# Patient Record
Sex: FEMALE | Race: WHITE | Employment: UNEMPLOYED | URBAN - METROPOLITAN AREA
[De-identification: names, ages, dates, MRNs, and addresses within clinical notes are randomized per-mention and may not be internally consistent; named-entity substitution may affect disease eponyms.]

---

## 2017-01-12 ENCOUNTER — OFFICE VISIT (OUTPATIENT)
Dept: CARDIOLOGY CLINIC | Age: 67
End: 2017-01-12

## 2017-01-12 VITALS
BODY MASS INDEX: 23.3 KG/M2 | SYSTOLIC BLOOD PRESSURE: 120 MMHG | DIASTOLIC BLOOD PRESSURE: 72 MMHG | HEIGHT: 66 IN | WEIGHT: 145 LBS | HEART RATE: 62 BPM

## 2017-01-12 DIAGNOSIS — R94.31 ABNORMAL EKG: ICD-10-CM

## 2017-01-12 DIAGNOSIS — E78.5 HYPERLIPIDEMIA, UNSPECIFIED HYPERLIPIDEMIA TYPE: ICD-10-CM

## 2017-01-12 DIAGNOSIS — I25.10 CORONARY ARTERY DISEASE INVOLVING NATIVE CORONARY ARTERY OF NATIVE HEART WITHOUT ANGINA PECTORIS: ICD-10-CM

## 2017-01-12 DIAGNOSIS — Z72.0 TOBACCO ABUSE: ICD-10-CM

## 2017-01-12 DIAGNOSIS — R06.02 SHORTNESS OF BREATH: Primary | ICD-10-CM

## 2017-01-12 PROCEDURE — 93000 ELECTROCARDIOGRAM COMPLETE: CPT | Performed by: INTERNAL MEDICINE

## 2017-01-12 PROCEDURE — 99214 OFFICE O/P EST MOD 30 MIN: CPT | Performed by: INTERNAL MEDICINE

## 2017-01-12 RX ORDER — ATORVASTATIN CALCIUM 40 MG/1
40 TABLET, FILM COATED ORAL DAILY
Qty: 30 TABLET | Refills: 6
Start: 2017-01-12 | End: 2018-10-24

## 2017-01-12 RX ORDER — ASPIRIN 81 MG/1
81 TABLET ORAL DAILY
Qty: 30 TABLET | Refills: 3
Start: 2017-01-12 | End: 2018-10-24

## 2017-01-12 RX ORDER — ATORVASTATIN CALCIUM 80 MG/1
80 TABLET, FILM COATED ORAL DAILY
Qty: 30 TABLET | Refills: 3
Start: 2017-01-12 | End: 2017-01-12 | Stop reason: SDUPTHER

## 2017-01-12 RX ORDER — METOPROLOL SUCCINATE 25 MG/1
25 TABLET, EXTENDED RELEASE ORAL DAILY
Qty: 30 TABLET | Refills: 6 | Status: SHIPPED | OUTPATIENT
Start: 2017-01-12 | End: 2018-10-24

## 2017-05-05 ENCOUNTER — TELEPHONE (OUTPATIENT)
Dept: CARDIOLOGY CLINIC | Age: 67
End: 2017-05-05

## 2018-10-24 ENCOUNTER — OFFICE VISIT (OUTPATIENT)
Dept: CARDIOLOGY CLINIC | Age: 68
End: 2018-10-24
Payer: MEDICARE

## 2018-10-24 VITALS
SYSTOLIC BLOOD PRESSURE: 120 MMHG | BODY MASS INDEX: 27.16 KG/M2 | DIASTOLIC BLOOD PRESSURE: 80 MMHG | HEART RATE: 67 BPM | WEIGHT: 169 LBS | HEIGHT: 66 IN

## 2018-10-24 DIAGNOSIS — R06.02 SHORTNESS OF BREATH: ICD-10-CM

## 2018-10-24 DIAGNOSIS — Z01.818 PRE-OPERATIVE CLEARANCE: Primary | ICD-10-CM

## 2018-10-24 DIAGNOSIS — Z72.0 TOBACCO ABUSE: ICD-10-CM

## 2018-10-24 PROCEDURE — 93000 ELECTROCARDIOGRAM COMPLETE: CPT | Performed by: INTERNAL MEDICINE

## 2018-10-24 PROCEDURE — 99214 OFFICE O/P EST MOD 30 MIN: CPT | Performed by: INTERNAL MEDICINE

## 2018-10-24 RX ORDER — METOPROLOL SUCCINATE 25 MG/1
25 TABLET, EXTENDED RELEASE ORAL DAILY
Qty: 30 TABLET | Refills: 6 | Status: SHIPPED | OUTPATIENT
Start: 2018-10-24 | End: 2019-10-24

## 2018-10-24 RX ORDER — ATORVASTATIN CALCIUM 40 MG/1
40 TABLET, FILM COATED ORAL DAILY
Qty: 30 TABLET | Refills: 6 | Status: SHIPPED | OUTPATIENT
Start: 2018-10-24 | End: 2019-10-24

## 2018-10-24 NOTE — PROGRESS NOTES
exacerbation. EKG today shows normal sinus rhythm with a leftward axis and no ischemia. Cath ([facility] radial artery) 12/21/16: left main free of ds, D1 long segment 99% stenosis w/ RUDY flow, LCx free of ds, RCA 50% proximal to mid, EF 60%  GXT 12/9/16: normal perfusion  Echo 12/9/16: essentially normal findings  Cath info found 6/24/02 at [facility] 1vessel branch disease    2. Hyperlipidemia -go back on Lipitor    3. Shortness of breath -due to COPD with bronchiectasis. 4. Tobacco abuse -quit years ago, but smokes occasionally       PLAN:  Advised to restart 81mg aspirin, Lipitor 40mg and Toprol XL 25mg daily for her coronary disease and these meds are not dependent on how she feels. . FU 6 months. She is stable for abdominal surgery. Thank you for allowing to me to participate in the care of Lucila. Scribe's attestation: This note was scribed in the presence of Dr Katerina Delgadillo MD by Mario Alberto Choi RN. The scribe's documentation has been prepared under my direction and personally reviewed by me in its entirety. I confirm that the note above accurately reflects all work, treatment, procedures, and medical decision making performed by me.

## 2018-11-12 ENCOUNTER — TELEPHONE (OUTPATIENT)
Dept: CARDIOLOGY CLINIC | Age: 68
End: 2018-11-12

## 2018-11-23 PROBLEM — Z01.818 PRE-OPERATIVE CLEARANCE: Status: RESOLVED | Noted: 2018-10-24 | Resolved: 2018-11-23

## 2018-12-13 ENCOUNTER — TELEPHONE (OUTPATIENT)
Dept: CARDIOLOGY CLINIC | Age: 68
End: 2018-12-13

## 2019-01-16 ENCOUNTER — TELEPHONE (OUTPATIENT)
Dept: CARDIOLOGY CLINIC | Age: 69
End: 2019-01-16

## 2019-01-16 DIAGNOSIS — R06.02 SHORTNESS OF BREATH: ICD-10-CM

## 2019-01-16 DIAGNOSIS — Z01.818 PRE-OPERATIVE CLEARANCE: Primary | ICD-10-CM

## 2019-02-15 PROBLEM — Z01.818 PRE-OPERATIVE CLEARANCE: Status: RESOLVED | Noted: 2018-10-24 | Resolved: 2019-02-15

## 2019-10-24 ENCOUNTER — OFFICE VISIT (OUTPATIENT)
Dept: CARDIOLOGY CLINIC | Age: 69
End: 2019-10-24
Payer: MEDICARE

## 2019-10-24 VITALS
SYSTOLIC BLOOD PRESSURE: 114 MMHG | BODY MASS INDEX: 24.11 KG/M2 | DIASTOLIC BLOOD PRESSURE: 64 MMHG | WEIGHT: 150 LBS | HEART RATE: 65 BPM | HEIGHT: 66 IN

## 2019-10-24 DIAGNOSIS — I25.10 CORONARY ARTERY DISEASE INVOLVING NATIVE CORONARY ARTERY OF NATIVE HEART WITHOUT ANGINA PECTORIS: Primary | ICD-10-CM

## 2019-10-24 DIAGNOSIS — R06.02 SHORTNESS OF BREATH: ICD-10-CM

## 2019-10-24 DIAGNOSIS — E78.5 HYPERLIPIDEMIA, UNSPECIFIED HYPERLIPIDEMIA TYPE: ICD-10-CM

## 2019-10-24 PROCEDURE — 93000 ELECTROCARDIOGRAM COMPLETE: CPT | Performed by: INTERNAL MEDICINE

## 2019-10-24 PROCEDURE — 99214 OFFICE O/P EST MOD 30 MIN: CPT | Performed by: INTERNAL MEDICINE

## 2019-10-24 RX ORDER — VARENICLINE TARTRATE 0.5 MG/1
0.5 TABLET, FILM COATED ORAL
COMMUNITY
Start: 2019-05-22 | End: 2021-12-14

## 2019-10-24 RX ORDER — ZOLPIDEM TARTRATE 10 MG/1
TABLET ORAL NIGHTLY PRN
COMMUNITY

## 2020-09-29 ENCOUNTER — TELEPHONE (OUTPATIENT)
Dept: CARDIOLOGY CLINIC | Age: 70
End: 2020-09-29

## 2020-09-29 NOTE — TELEPHONE ENCOUNTER
Pt calling to schedule her FU appt at [facility].  Please call pt to schedule

## 2020-10-21 ENCOUNTER — OFFICE VISIT (OUTPATIENT)
Dept: CARDIOLOGY CLINIC | Age: 70
End: 2020-10-21
Payer: MEDICARE

## 2020-10-21 VITALS
DIASTOLIC BLOOD PRESSURE: 64 MMHG | OXYGEN SATURATION: 97 % | SYSTOLIC BLOOD PRESSURE: 104 MMHG | HEART RATE: 70 BPM | BODY MASS INDEX: 23.78 KG/M2 | WEIGHT: 148 LBS | HEIGHT: 66 IN

## 2020-10-21 PROCEDURE — 99214 OFFICE O/P EST MOD 30 MIN: CPT | Performed by: NURSE PRACTITIONER

## 2020-10-21 NOTE — PROGRESS NOTES
[address]   Cardiac Evaluation      Patient: Serina Alexandre  YOB: 1950  Date: 10/21/20     CC: CAD      Referring provider: FATUMA Multani CNP    History of Present Illness:   Ms Feliz Salazar is seen today for cardiac clearance for gastric sleeve. She has had a lab band and has been told that her esophagus is dilated which required some or all of the saline removed from the band. She has requested to have it filled again due to increasing weight and has been told that this is not medically appropriate. She presented to [facility] in 2016 with COPD exacerbation. trops consistently increased; 0.362, 0.967, 1.670. She underwent a cath through the radial artery. She has disease in D1 and RCA. She was placed on Metoprolol, Lipitor, Plavix, and asa. She states I performed an angiogram years ago (prior to 2007)and that she had CAD with  collaterals. This was performed after she had an MI. She has extensive pulmonary disease w/ COPD but has quit smoking. . Surgeries include lap band surgery, tonsillectomy, carpal tunnel release, tubal ligation. She states she smokes on occasion, but not regularly. In the interim since last 10/2018, she had gastric band removed and pouch formed. At that last visit, she told me she stopped taking asa, BB, & statin because she \"felt fine\"; I advised her to restart all of them for her overall CV health. Today, Ms. Shirley is here for her yearly follow up. She states she has no complaints. She has been active walking around the yard with her three dogs. She denies chest pain, shortness of breath, palpitations, dizziness, or edema. She enjoys embroidering, has a large sewing/embroidery machine in her sewing room, belongs to a club. Past Medical History:   has a past medical history of MI (myocardial infarction) ([address] COPD, morbid obesity. Surgical History:   has a past surgical history that includes Tonsillectomy; Lap Band; Carpal tunnel release;  Foot surgery; Tubal ligation; partial hysterectomy (cervix not removed); and bladder repair (2-14). Current Outpatient Medications   Medication Sig Dispense Refill    umeclidinium-vilanterol (ANORO ELLIPTA) 62.5-25 MCG/INH AEPB inhaler Inhale 62.5 mcg into the lungs daily      fluticasone (VERAMYST) 27.5 MCG/SPRAY nasal spray 2 sprays by Each Nostril route daily      zolpidem (AMBIEN) 10 MG tablet Take by mouth nightly as needed for Sleep.  varenicline (CHANTIX) 0.5 MG tablet Take 0.5 mg by mouth      PROVENTIL  (90 BASE) MCG/ACT inhaler Inhale 2 puffs into the lungs every 6 hours as needed.  citalopram (CELEXA) 20 MG tablet Take 1 tablet by mouth daily. No current facility-administered medications for this visit.       Social History:  Social History     Socioeconomic History    Marital status:      Spouse name: Not on file    Number of children: Not on file    Years of education: Not on file    Highest education level: Not on file   Occupational History    Not on file   Social Needs    Financial resource strain: Not on file    Food insecurity     Worry: Not on file     Inability: Not on file    Transportation needs     Medical: Not on file     Non-medical: Not on file   Tobacco Use    Smoking status: Former Smoker     Packs/day: 1.00     Years: 40.00     Pack years: 40.00     Last attempt to quit: 2013     Years since quittin.3    Smokeless tobacco: Never Used   Substance and Sexual Activity    Alcohol use: No    Drug use: Not on file    Sexual activity: Not on file   Lifestyle    Physical activity     Days per week: Not on file     Minutes per session: Not on file    Stress: Not on file   Relationships    Social connections     Talks on phone: Not on file     Gets together: Not on file     Attends Christian service: Not on file     Active member of club or organization: Not on file     Attends meetings of clubs or organizations: Not on file Relationship status: Not on file    Intimate partner violence     Fear of current or ex partner: Not on file     Emotionally abused: Not on file     Physically abused: Not on file     Forced sexual activity: Not on file   Other Topics Concern    Not on file   Social History Narrative    Not on file     Family History:  family history includes Diabetes in her father and mother; Heart Disease in her father. Allergies:  Percocet [oxycodone-acetaminophen]     Review of Systems:   · Constitutional: there has been no unanticipated weight loss. No change in energy or activity level   · Eyes: No visual changes   · ENT: No Headaches, hearing loss or vertigo. No mouth sores or sore throat. · Cardiovascular: Reviewed in HPI  · Respiratory: No cough or wheezing, no sputum production. · Gastrointestinal: No abdominal pain, appetite loss, blood in stools. No change in bowel or bladder habits. · Genitourinary: No nocturia, dysuria, trouble voiding  · Musculoskeletal:  No gait disturbance, weakness or joint complaints. · Integumentary: No rash or pruritis. · Neurological: No headache, change in muscle strength, numbness or tingling. No change in gait, balance, coordination, mood, affect, memory, mentation, behavior. · Psychiatric: No anxiety or depression  · Endocrine: No malaise or fever  · Hematologic/Lymphatic: No abnormal bruising or bleeding, blood clots or swollen lymph nodes. · Allergic/Immunologic: No nasal congestion or hives. Physical Examination:    Vitals:    10/21/20 1346   BP: 104/64   Site: Left Upper Arm   Position: Sitting   Cuff Size: Medium Adult   Pulse: 70   SpO2: 97%   Weight: 148 lb (67.1 kg)   Height: 5' 6\" (1.676 m)     Body mass index is 23.89 kg/m².      Wt Readings from Last 3 Encounters:   10/21/20 148 lb (67.1 kg)   10/24/19 150 lb (68 kg)   10/24/18 169 lb (76.7 kg)      BP Readings from Last 3 Encounters:   10/21/20 104/64   10/24/19 114/64   10/24/18 120/80      Constitutional and General Appearance:  appears stated age,  thin (especially compared to 14 years ago)  Eyes - no xanthelasma  Respiratory:  · Normal excursion and expansion without use of accessory muscles  · Resp Auscultation: Normal breath sounds without dullness  Cardiovascular:  · The apical impulses not displaced  · Heart is regular rate and rhythm with normal S1, S2  · PMI is normal  · The carotid upstroke is normal, no bruit noted   · JVP is not elevated  · Peripheral pulses are symmetrical  · There is no clubbing, cyanosis of the extremities  · No edema  · No varicosities  · Femoral Arteries: 2+ and equal without bruits  · Pedal Pulses: 2+ and equal   Abdomen:  · No masses or tenderness  · Aorta not palpable  · Normal bowel sounds  Neurological/Psychiatric:  · Alert and oriented x3  · Moves all extremities well  · Exhibits normal gait balance and coordination    Assessment:  1. CAD: Stable, no anginal symptoms. She will not take aspirin due to bruising. NSTEMI while at [facility] w/ COPD exacerbation. EKG today 10/24/19 (read & interpreted by me)> NSR 65  EKG 10/24/18> normal sinus rhythm with a leftward axis and no ischemia. Cath ([facility] radial artery) 12/21/16> left main free of ds, D1 long segment 99% stenosis w/ RUDY flow, LCx free of ds, RCA 50% proximal to mid, EF 60%  GXT raiza 12/9/16> normal perfusion  Echo 12/9/16> essentially normal findings  Cath info found 6/24/02 at [facility] 1 vessel branch disease    2. Hyperlipidemia: Restarted Lipitor 40mg at last visit 10/2018, but she only took it for a short time. 10/9/19> , TG 58, HDL 75, LDL 88 -- drawn off statin. 3. Shortness of breath, chronic: Stable, able to do stairs without issues. Due to COPD with bronchiectasis. 4. Tobacco abuse: quit years ago. She takes Chantix when she is around others who are smoking. Plan: Check yearly lipids/BMP. No medication changes at this time. Return in one year with Dr. Torres Read, or sooner if needed.      Thank you for allowing

## 2021-10-25 ENCOUNTER — TELEPHONE (OUTPATIENT)
Dept: CARDIOLOGY CLINIC | Age: 71
End: 2021-10-25

## 2021-10-25 NOTE — TELEPHONE ENCOUNTER
She has an appt this week with MANUELITO in the [address] [place] [address],[address] office . She needs to rs due to a death in the family .

## 2021-12-14 ENCOUNTER — OFFICE VISIT (OUTPATIENT)
Dept: CARDIOLOGY CLINIC | Age: 71
End: 2021-12-14
Payer: MEDICARE

## 2021-12-14 VITALS
HEIGHT: 66 IN | SYSTOLIC BLOOD PRESSURE: 124 MMHG | WEIGHT: 140 LBS | HEART RATE: 66 BPM | OXYGEN SATURATION: 99 % | DIASTOLIC BLOOD PRESSURE: 78 MMHG | BODY MASS INDEX: 22.5 KG/M2

## 2021-12-14 DIAGNOSIS — R06.02 SHORTNESS OF BREATH: ICD-10-CM

## 2021-12-14 DIAGNOSIS — R94.31 ABNORMAL EKG: ICD-10-CM

## 2021-12-14 DIAGNOSIS — I25.10 CORONARY ARTERY DISEASE INVOLVING NATIVE CORONARY ARTERY OF NATIVE HEART WITHOUT ANGINA PECTORIS: Primary | ICD-10-CM

## 2021-12-14 DIAGNOSIS — Z72.0 TOBACCO ABUSE: ICD-10-CM

## 2021-12-14 PROCEDURE — 99214 OFFICE O/P EST MOD 30 MIN: CPT | Performed by: INTERNAL MEDICINE

## 2021-12-14 NOTE — PROGRESS NOTES
[facility]   Cardiac Evaluation      Patient: Cynthia Malcolm  YOB: 1950  Date: 12/14/21     CC: CAD      Referring provider: FATUMA Bautista CNP    History of Present Illness:   Ms Samy Meyer is seen today for follow up. She has had a lab band and has been told that her esophagus is dilated which required some or all of the saline removed from the band. She has requested to have it filled again due to increasing weight and has been told that this is not medically appropriate. She presented to [facility] in 2016 with COPD exacerbation. trops consistently increased; 0.362, 0.967, 1.670. She underwent a cath through the radial artery. She has disease in D1 and RCA. She was placed on Metoprolol, Lipitor, Plavix, and asa. She states I performed an angiogram years ago (prior to 2007)and that she had CAD with  collaterals. This was performed after she had an MI. She has extensive pulmonary disease w/ COPD but has quit smoking. . Surgeries include lap band surgery, tonsillectomy, carpal tunnel release, tubal ligation. She states she smokes on occasion, but not regularly. In the interim since last 10/2018, she had gastric band removed and pouch formed. At that last visit, she told me she stopped taking asa, BB, & statin because she \"felt fine\"; I advised her to restart all of them for her overall CV health. Today, Ms. Shirley is here for her yearly follow up. She offers no complaints. Barbie denies chest pain, palpitations, SHULTZ, dizziness, or edema. She does leg lifts and walks stairs at home for exercise. Past Medical History:   has a past medical history of MI (myocardial infarction) ([address] COPD, morbid obesity. Surgical History:   has a past surgical history that includes Tonsillectomy; Lap Band; Carpal tunnel release; Foot surgery; Tubal ligation; partial hysterectomy (cervix not removed); and bladder repair (2-1-14).      Current Outpatient Medications   Medication Sig Dispense Refill    tiotropium (SPIRIVA RESPIMAT) 2.5 MCG/ACT AERS inhaler INHALE ONE PUFF BY MOUTH DAILY      mometasone-formoterol (DULERA) 200-5 MCG/ACT inhaler Inhale 2 puffs into the lungs 2 times daily      zolpidem (AMBIEN) 10 MG tablet Take by mouth nightly as needed for Sleep.  citalopram (CELEXA) 20 MG tablet Take 1 tablet by mouth daily.  fluticasone (VERAMYST) 27.5 MCG/SPRAY nasal spray 2 sprays by Each Nostril route daily      PROVENTIL  (90 BASE) MCG/ACT inhaler Inhale 2 puffs into the lungs every 6 hours as needed. No current facility-administered medications for this visit. Social History:  Social History     Socioeconomic History    Marital status:      Spouse name: Not on file    Number of children: Not on file    Years of education: Not on file    Highest education level: Not on file   Occupational History    Not on file   Tobacco Use    Smoking status: Former Smoker     Packs/day: 1.00     Years: 40.00     Pack years: 40.00     Quit date: 2013     Years since quittin.4    Smokeless tobacco: Never Used   Substance and Sexual Activity    Alcohol use: No    Drug use: Not on file    Sexual activity: Not on file   Other Topics Concern    Not on file   Social History Narrative    Not on file     Social Determinants of Health     Financial Resource Strain:     Difficulty of Paying Living Expenses: Not on file   Food Insecurity:     Worried About [address] [employer] in the Last Year: Not on file    Clarisa of Food in the Last Year: Not on file   Transportation Needs:     Lack of Transportation (Medical): Not on file    Lack of Transportation (Non-Medical):  Not on file   Physical Activity:     Days of Exercise per Week: Not on file    Minutes of Exercise per Session: Not on file   Stress:     Feeling of Stress : Not on file   Social Connections:     Frequency of Communication with Friends and Family: Not on file    Frequency of Social Gatherings with Friends and Family: Not on file    Attends Rastafari Services: Not on file    Active Member of Clubs or Organizations: Not on file    Attends Club or Organization Meetings: Not on file    Marital Status: Not on file   Intimate Partner Violence:     Fear of Current or Ex-Partner: Not on file    Emotionally Abused: Not on file    Physically Abused: Not on file    Sexually Abused: Not on file   Housing Stability:     Unable to Pay for Housing in the Last Year: Not on file    Number of [place] in the Last Year: Not on file    Unstable Housing in the Last Year: Not on file     Family History:  family history includes Diabetes in her father and mother; Heart Disease in her father. Allergies:  Percocet [oxycodone-acetaminophen]     Review of Systems:   · Constitutional: there has been no unanticipated weight loss. No change in energy or activity level   · Eyes: No visual changes   · ENT: No Headaches, hearing loss or vertigo. No mouth sores or sore throat. · Cardiovascular: Reviewed in HPI  · Respiratory: No cough or wheezing, no sputum production. · Gastrointestinal: No abdominal pain, appetite loss, blood in stools. No change in bowel or bladder habits. · Genitourinary: No nocturia, dysuria, trouble voiding  · Musculoskeletal:  No gait disturbance, weakness or joint complaints. · Integumentary: No rash or pruritis. · Neurological: No headache, change in muscle strength, numbness or tingling. No change in gait, balance, coordination, mood, affect, memory, mentation, behavior. · Psychiatric: No anxiety or depression  · Endocrine: No malaise or fever  · Hematologic/Lymphatic: No abnormal bruising or bleeding, blood clots or swollen lymph nodes. · Allergic/Immunologic: No nasal congestion or hives.     Physical Examination:    Vitals:    12/14/21 1458   BP: 124/78   Site: Left Upper Arm   Position: Sitting   Cuff Size: Medium Adult   Pulse: 66   SpO2: 99%   Weight: 140 lb (63.5 kg) Height: 5' 6\" (1.676 m)     Body mass index is 22.6 kg/m². Wt Readings from Last 3 Encounters:   12/14/21 140 lb (63.5 kg)   10/21/20 148 lb (67.1 kg)   10/24/19 150 lb (68 kg)      BP Readings from Last 3 Encounters:   12/14/21 124/78   10/21/20 104/64   10/24/19 114/64      Constitutional and General Appearance:  appears stated age,  thin (especially compared to 14 years ago)  Eyes - no xanthelasma  Respiratory:  · Normal excursion and expansion without use of accessory muscles  · Resp Auscultation: Normal breath sounds without dullness  Cardiovascular:  · The apical impulses not displaced  · Heart is regular rate and rhythm with normal S1, S2  · PMI is normal  · The carotid upstroke is normal, no bruit noted   · JVP is not elevated  · Peripheral pulses are symmetrical  · There is no clubbing, cyanosis of the extremities  · No edema  · No varicosities  · Femoral Arteries: 2+ and equal without bruits  · Pedal Pulses: 2+ and equal   Abdomen:  · No masses or tenderness  · Aorta not palpable  · Normal bowel sounds  Neurological/Psychiatric:  · Alert and oriented x3  · Moves all extremities well  · Exhibits normal gait balance and coordination    Assessment:  1. CAD: Stable, no anginal symptoms. She will not take aspirin due to bruising  NSTEMI while at [facility] w/ COPD exacerbation  EKG 10/24/18> normal sinus rhythm with a leftward axis and no ischemia. Cath ([facility] radial artery) 12/21/16> left main free of ds, D1 long segment 99% stenosis w/ RUDY flow, LCx free of ds, RCA 50% proximal to mid, EF 60%  GXT raiza 12/9/16> normal perfusion  Echo 12/9/16> essentially normal findings  Cath info found 6/24/02 at [facility] 1 vessel branch disease    2. Hyperlipidemia: Restarted Lipitor 40mg 10/2018, but she only took it for a short time. 4/15/21>; TRIG 74; HDL 74; , she is not taking statin at this time    3. Shortness of breath, chronic: Stable, able to do stairs without issues. Due to COPD with bronchiectasis   4.

## 2022-11-28 ENCOUNTER — TELEPHONE (OUTPATIENT)
Dept: CARDIOLOGY CLINIC | Age: 72
End: 2022-11-28

## 2023-01-09 NOTE — PROGRESS NOTES
[address]   Cardiac Evaluation      Patient: Eden Ann  YOB: 1950  Date: 1/16/23     CC: CAD      Referring provider: FATUMA Still CNP    History of Present Illness:   Ms Zuly Tong is seen today for follow up. She has had a lab band and has been told that her esophagus is dilated which required some or all of the saline removed from the band. She has requested to have it filled again due to increasing weight and has been told that this is not medically appropriate. She presented to [facility] in 2016 with COPD exacerbation. trops consistently increased; 0.362, 0.967, 1.670. She underwent a cath through the radial artery. She has disease in D1 and RCA. She was placed on Metoprolol, Lipitor, Plavix, and asa. She states I performed an angiogram years ago (prior to 2007)and that she had CAD with  collaterals. This was performed after she had an MI. She has extensive pulmonary disease w/ COPD but has quit smoking. . Surgeries include lap band surgery, tonsillectomy, carpal tunnel release, tubal ligation. She states she smokes on occasion, but not regularly. In the interim since last 10/2018, she had gastric band removed and pouch formed. At that last visit, she told me she stopped taking asa, BB, & statin because she \"felt fine\"; I advised her to restart all of them for her overall CV health. Today, Ms. Shirley states she has been feeling well. She denies chest pain, palpitations, SHULTZ, dizziness, or edema. Ms Zuly Tong remains physically active. Past Medical History:   has a past medical history of MI (myocardial infarction) ([facility] [address] COPD, morbid obesity. Surgical History:   has a past surgical history that includes Tonsillectomy; Lap Band; Carpal tunnel release; Foot surgery; Tubal ligation; Partial hysterectomy; and bladder repair (2-1-14).      Current Outpatient Medications   Medication Sig Dispense Refill    ferrous sulfate (IRON 325) 325 (65 Fe) MG tablet Take 325 mg by mouth daily      rOPINIRole (REQUIP) 0.5 MG tablet       tiotropium (SPIRIVA RESPIMAT) 2.5 MCG/ACT AERS inhaler INHALE ONE PUFF BY MOUTH DAILY      mometasone-formoterol (DULERA) 200-5 MCG/ACT inhaler Inhale 2 puffs into the lungs 2 times daily      fluticasone (VERAMYST) 27.5 MCG/SPRAY nasal spray 2 sprays by Each Nostril route daily      zolpidem (AMBIEN) 10 MG tablet Take by mouth nightly as needed for Sleep.      citalopram (CELEXA) 20 MG tablet Take 1 tablet by mouth daily. PROVENTIL  (90 BASE) MCG/ACT inhaler Inhale 2 puffs into the lungs every 6 hours as needed. No current facility-administered medications for this visit. Social History:  Social History     Socioeconomic History    Marital status:      Spouse name: Not on file    Number of children: Not on file    Years of education: Not on file    Highest education level: Not on file   Occupational History    Not on file   Tobacco Use    Smoking status: Former     Packs/day: 1.00     Years: 40.00     Pack years: 40.00     Types: Cigarettes     Quit date: 2013     Years since quittin.5    Smokeless tobacco: Never   Substance and Sexual Activity    Alcohol use: No    Drug use: Not on file    Sexual activity: Not on file   Other Topics Concern    Not on file   Social History Narrative    Not on file     Social Determinants of Health     Financial Resource Strain: Not on file   Food Insecurity: Not on file   Transportation Needs: Not on file   Physical Activity: Not on file   Stress: Not on file   Social Connections: Not on file   Intimate Partner Violence: Not on file   Housing Stability: Not on file     Family History:  family history includes Diabetes in her father and mother; Heart Disease in her father. Allergies:  Percocet [oxycodone-acetaminophen]     Review of Systems:   Constitutional: there has been no unanticipated weight loss.  No change in energy or activity level   Eyes: No visual changes   ENT: No Headaches, hearing loss or vertigo. No mouth sores or sore throat. Cardiovascular: Reviewed in HPI  Respiratory: No cough or wheezing, no sputum production. Gastrointestinal: No abdominal pain, appetite loss, blood in stools. No change in bowel or bladder habits. Genitourinary: No nocturia, dysuria, trouble voiding  Musculoskeletal:  No gait disturbance, weakness or joint complaints. Integumentary: No rash or pruritis. Neurological: No headache, change in muscle strength, numbness or tingling. No change in gait, balance, coordination, mood, affect, memory, mentation, behavior. Psychiatric: No anxiety or depression  Endocrine: No malaise or fever  Hematologic/Lymphatic: No abnormal bruising or bleeding, blood clots or swollen lymph nodes. Allergic/Immunologic: No nasal congestion or hives. Physical Examination:    Vitals:    01/16/23 1547   BP: 126/82   Site: Left Upper Arm   Position: Sitting   Cuff Size: Medium Adult   Pulse: 63   SpO2: 98%   Weight: 147 lb (66.7 kg)   Height: 5' 6\" (1.676 m)       Body mass index is 23.73 kg/m². Wt Readings from Last 3 Encounters:   01/16/23 147 lb (66.7 kg)   12/14/21 140 lb (63.5 kg)   10/21/20 148 lb (67.1 kg)      BP Readings from Last 3 Encounters:   01/16/23 126/82   12/14/21 124/78   10/21/20 104/64      Constitutional and General Appearance:  appears stated age,  thin (especially compared to 14 years ago)  Eyes - no xanthelasma  Respiratory:  Normal excursion and expansion without use of accessory muscles  Resp Auscultation: Normal breath sounds without dullness  Cardiovascular:   The apical impulses not displaced  Heart is regular rate and rhythm with normal S1, S2  PMI is normal  The carotid upstroke is normal, no bruit noted   JVP is not elevated  Peripheral pulses are symmetrical  There is no clubbing, cyanosis of the extremities  No edema  No varicosities  Femoral Arteries: 2+ and equal without bruits  Pedal Pulses: 2+ and equal   Abdomen:  No masses or tenderness  Aorta not palpable  Normal bowel sounds  Neurological/Psychiatric:  Alert and oriented x3  Moves all extremities well  Exhibits normal gait balance and coordination    Assessment:  1. CAD: Stable, no anginal symptoms. She will not take aspirin due to bruising  NSTEMI while at [facility] w/ COPD exacerbation  EKG 10/24/18> normal sinus rhythm with a leftward axis and no ischemia. Cath ([facility] radial artery) 12/21/16> left main free of ds, D1 long segment 99% stenosis w/ RUDY flow, LCx free of ds, RCA 50% proximal to mid, EF 60%  GXT raiza 12/9/16> normal perfusion  Echo 12/9/16> essentially normal findings  Cath info found 6/24/02 at [facility] 1 vessel branch disease    2. Hyperlipidemia: Restarted Lipitor 40mg 10/2018, but she only took it for a short time. 4/15/21>; TRIG 74; HDL 74; , she is not taking statin at this time   Labs 6/7/22: ; TRIG 52; HDL 65; LDL 76   3. Shortness of breath, chronic: Stable, able to do stairs without issues. Due to COPD with bronchiectasis   4. Tobacco abuse: quit years ago. She takes Chantix when she is around others who are smoking. 5.      Chronic anemia, hypochromic, microcytic. (7.9 - 11 during the past year)  - most likely due to her previous gastric surgeries and poor absorption. Plan:  She has been advised to take asa daily, again. Regular exercise encouraged. FU 1 year. Scribe's attestation: This note was scribed in the presence of Dr Baron Abad MD by Sudhir Ring RN. The scribe's documentation has been prepared under my direction and personally reviewed by me in its entirety. I confirm that the note above accurately reflects all work, treatment, procedures, and medical decision making performed by me. Thank you for allowing to me to participate in the care of Lucila.

## 2023-01-16 ENCOUNTER — OFFICE VISIT (OUTPATIENT)
Dept: CARDIOLOGY CLINIC | Age: 73
End: 2023-01-16

## 2023-01-16 VITALS
OXYGEN SATURATION: 98 % | WEIGHT: 147 LBS | SYSTOLIC BLOOD PRESSURE: 126 MMHG | HEART RATE: 63 BPM | HEIGHT: 66 IN | DIASTOLIC BLOOD PRESSURE: 82 MMHG | BODY MASS INDEX: 23.63 KG/M2

## 2023-01-16 DIAGNOSIS — R94.31 ABNORMAL EKG: Primary | ICD-10-CM

## 2023-01-16 DIAGNOSIS — R06.02 SHORTNESS OF BREATH: ICD-10-CM

## 2023-01-16 DIAGNOSIS — I25.10 CORONARY ARTERY DISEASE INVOLVING NATIVE CORONARY ARTERY OF NATIVE HEART WITHOUT ANGINA PECTORIS: ICD-10-CM

## 2023-01-16 RX ORDER — FERROUS SULFATE 325(65) MG
325 TABLET ORAL DAILY
COMMUNITY
Start: 2022-12-05

## 2023-01-16 RX ORDER — ROPINIROLE 0.5 MG/1
TABLET, FILM COATED ORAL
COMMUNITY
Start: 2022-12-06

## 2023-03-16 ENCOUNTER — TELEPHONE (OUTPATIENT)
Dept: CARDIOLOGY CLINIC | Age: 73
End: 2023-03-16

## 2023-03-16 DIAGNOSIS — R00.2 PALPITATIONS: ICD-10-CM

## 2023-03-16 DIAGNOSIS — R06.02 SHORTNESS OF BREATH: Primary | ICD-10-CM

## 2023-03-16 NOTE — TELEPHONE ENCOUNTER
Per Dr. Con Chen she should come to the [place] office to get a 14 day holter and follow up afterwards.      I spoke to pt she can come in on Monday for the Monitor and her F/U apt was scheduled at the [facility] office in April
Pt states about 15 min ago her heart started pounding in ears and she was sob for about 10 min. Please call to advise.
no

## 2023-03-20 ENCOUNTER — NURSE ONLY (OUTPATIENT)
Dept: CARDIOLOGY CLINIC | Age: 73
End: 2023-03-20

## 2023-03-20 NOTE — PROGRESS NOTES
14 day E- Patch requested for pt.  Device was registered and placed.Tutorial given, pt verbalized understanding. Date 03/20/2023  Time 0100pm S/N [id number]  marquis

## 2023-03-24 NOTE — PROGRESS NOTES
[facility]   Cardiac Evaluation      Patient: Homa Figueroa  YOB: 1950  Date: 4/6/23     CC: CAD      Referring provider: FATUMA Murphy CNP    History of Present Illness:   Ms Hai Acosta is seen today for follow up to a cardiac monitor. She called the office on 3/16/23 stating her heart was pounding in her ears and she was SOB. She had a 14 day cardiac monitor placed and is here to follow up. Monitor was worn for 10 days and revealed atrial fibrillation, SVT longest event 21 beats. he also had an episode of double vision that lasted minutes. Barbie denies chest pain, SHULTZ, dizziness, or edema. She is currently taking care of her  who has been ill. She also baby sits a great grandchild. Liliam Franks states she does not sleep well at night. She wakes after 4 hours and cannot go back to sleep. She feels tired in the mornings; does not nap during the day. She has had a lab band and has been told that her esophagus is dilated which required some or all of the saline removed from the band. She has requested to have it filled again due to increasing weight and has been told that this is not medically appropriate. She presented to [facility] in 2016 with COPD exacerbation. trops consistently increased; 0.362, 0.967, 1.670. She underwent a cath through the radial artery. She has disease in D1 and RCA. She was placed on Metoprolol, Lipitor, Plavix, and asa. She states I performed an angiogram years ago (prior to 2007)and that she had CAD with  collaterals. This was performed after she had an MI. She has extensive pulmonary disease w/ COPD but has quit smoking. . Surgeries include lap band surgery, tonsillectomy, carpal tunnel release, tubal ligation. In the interim since last 10/2018, she had gastric band removed and pouch formed.  At that last visit, she told me she stopped taking asa, BB, & statin because she \"felt fine\"; I advised her to restart all of them for her overall CV health

## 2023-03-30 ENCOUNTER — TELEPHONE (OUTPATIENT)
Dept: CARDIOLOGY CLINIC | Age: 73
End: 2023-03-30

## 2023-03-30 NOTE — TELEPHONE ENCOUNTER
Pt states she was only able to wear the monitor 10 day due to it eating her skin.  States she is going to bring back to the hospital.

## 2023-04-06 ENCOUNTER — OFFICE VISIT (OUTPATIENT)
Dept: CARDIOLOGY CLINIC | Age: 73
End: 2023-04-06
Payer: MEDICARE

## 2023-04-06 VITALS
HEART RATE: 61 BPM | DIASTOLIC BLOOD PRESSURE: 80 MMHG | BODY MASS INDEX: 24.75 KG/M2 | HEIGHT: 66 IN | WEIGHT: 154 LBS | OXYGEN SATURATION: 96 % | SYSTOLIC BLOOD PRESSURE: 148 MMHG

## 2023-04-06 DIAGNOSIS — E78.49 OTHER HYPERLIPIDEMIA: ICD-10-CM

## 2023-04-06 DIAGNOSIS — I25.10 CORONARY ARTERY DISEASE INVOLVING NATIVE CORONARY ARTERY OF NATIVE HEART WITHOUT ANGINA PECTORIS: Primary | ICD-10-CM

## 2023-04-06 DIAGNOSIS — R00.2 PALPITATIONS: ICD-10-CM

## 2023-04-06 DIAGNOSIS — I48.0 PAF (PAROXYSMAL ATRIAL FIBRILLATION) (HCC): ICD-10-CM

## 2023-04-06 DIAGNOSIS — I47.1 SVT (SUPRAVENTRICULAR TACHYCARDIA) (HCC): ICD-10-CM

## 2023-04-06 PROCEDURE — 1123F ACP DISCUSS/DSCN MKR DOCD: CPT | Performed by: INTERNAL MEDICINE

## 2023-04-06 PROCEDURE — 93000 ELECTROCARDIOGRAM COMPLETE: CPT | Performed by: INTERNAL MEDICINE

## 2023-04-06 PROCEDURE — 99214 OFFICE O/P EST MOD 30 MIN: CPT | Performed by: INTERNAL MEDICINE

## 2023-04-06 RX ORDER — METOPROLOL SUCCINATE 50 MG/1
50 TABLET, EXTENDED RELEASE ORAL DAILY
Qty: 90 TABLET | Refills: 3 | Status: SHIPPED | OUTPATIENT
Start: 2023-04-06

## 2023-04-06 RX ORDER — ATORVASTATIN CALCIUM 20 MG/1
20 TABLET, FILM COATED ORAL DAILY
Qty: 90 TABLET | Refills: 3 | Status: SHIPPED | OUTPATIENT
Start: 2023-04-06

## 2023-05-01 ENCOUNTER — PROCEDURE VISIT (OUTPATIENT)
Dept: CARDIOLOGY CLINIC | Age: 73
End: 2023-05-01
Payer: MEDICARE

## 2023-05-01 DIAGNOSIS — I47.1 SVT (SUPRAVENTRICULAR TACHYCARDIA) (HCC): ICD-10-CM

## 2023-05-01 DIAGNOSIS — I48.0 PAF (PAROXYSMAL ATRIAL FIBRILLATION) (HCC): ICD-10-CM

## 2023-05-01 DIAGNOSIS — I25.10 CORONARY ARTERY DISEASE INVOLVING NATIVE CORONARY ARTERY OF NATIVE HEART WITHOUT ANGINA PECTORIS: ICD-10-CM

## 2023-05-01 LAB
LV EF: 55 %
LVEF MODALITY: NORMAL

## 2023-05-01 PROCEDURE — 93306 TTE W/DOPPLER COMPLETE: CPT | Performed by: INTERNAL MEDICINE

## 2023-06-23 ENCOUNTER — TELEPHONE (OUTPATIENT)
Dept: CARDIOLOGY CLINIC | Age: 73
End: 2023-06-23

## 2023-06-23 NOTE — TELEPHONE ENCOUNTER
We received a fax from the pharmacy that pt cant afford Eliquis and would like a cheaper alternative. Per Dr. Livier Rashid the alternative is Warfarin and she would need to establish with the coumadin clinic for INR checks. Left 2 different message on machine for pt to call us back.

## 2023-06-28 ENCOUNTER — TELEPHONE (OUTPATIENT)
Dept: CARDIOLOGY CLINIC | Age: 73
End: 2023-06-28

## 2023-06-28 NOTE — TELEPHONE ENCOUNTER
I spoke to pt and let her know Warfarin was the alternative. I also explained the need for monitoring and regular labs done. She is on vacation and ask us to send a RX to her local pharmacy and she will decide when she gets back. I ask if we could sent it to a pharmacy close to where she was. I did let her know there is an increase risk of stroke if she is not taking a anticoagulant. Pt states she thinks she will be fine until she gets home.

## 2023-08-16 NOTE — PROGRESS NOTES
[facility]   Cardiac Evaluation      Patient: Micaela Gray  YOB: 1950  Date: 8/24/23     CC: CAD      Referring provider: FATUMA Gore CNP    History of Present Illness:   Ms Jeni Terry is seen today for follow up. She called the office on 3/16/23 stating her heart was pounding in her ears and she was SOB. She had a 14 day cardiac monitor placed and is here to follow up. Monitor was worn for 10 days and revealed atrial fibrillation, SVT longest event 21 beats. he also had an episode of double vision that lasted minutes. Barbie denies chest pain, SHULTZ, dizziness, or edema. She is currently taking care of her  who has been ill. She also baby sits a great grandchild. Melissa Lozano states she does not sleep well at night. She wakes after 4 hours and cannot go back to sleep. She feels tired in the mornings; does not nap during the day. She has had a lab band and has been told that her esophagus is dilated which required some or all of the saline removed from the band. She has requested to have it filled again due to increasing weight and has been told that this is not medically appropriate. She presented to [facility] in 2016 with COPD exacerbation. trops consistently increased; 0.362, 0.967, 1.670. She underwent a cath through the radial artery. She has disease in D1 and RCA. She was placed on Metoprolol, Lipitor, Plavix, and asa. She states I performed an angiogram years ago (prior to 2007)and that she had CAD with  collaterals. This was performed after she had an MI. She has extensive pulmonary disease w/ COPD but has quit smoking. . Surgeries include lap band surgery, tonsillectomy, carpal tunnel release, tubal ligation. In the interim since last 10/2018, she had gastric band removed and pouch formed.  At a previous visit, she told me she stopped taking asa, BB, & statin because she \"felt fine\"; I advised her to restart all of them for her overall CV health but she declined at

## 2023-08-24 ENCOUNTER — OFFICE VISIT (OUTPATIENT)
Dept: CARDIOLOGY CLINIC | Age: 73
End: 2023-08-24
Payer: MEDICARE

## 2023-08-24 VITALS
HEART RATE: 63 BPM | SYSTOLIC BLOOD PRESSURE: 104 MMHG | DIASTOLIC BLOOD PRESSURE: 62 MMHG | HEIGHT: 66 IN | WEIGHT: 156 LBS | BODY MASS INDEX: 25.07 KG/M2 | OXYGEN SATURATION: 94 %

## 2023-08-24 DIAGNOSIS — E78.49 OTHER HYPERLIPIDEMIA: ICD-10-CM

## 2023-08-24 DIAGNOSIS — I48.0 PAF (PAROXYSMAL ATRIAL FIBRILLATION) (HCC): ICD-10-CM

## 2023-08-24 DIAGNOSIS — I25.10 CORONARY ARTERY DISEASE INVOLVING NATIVE CORONARY ARTERY OF NATIVE HEART WITHOUT ANGINA PECTORIS: Primary | ICD-10-CM

## 2023-08-24 PROCEDURE — 93000 ELECTROCARDIOGRAM COMPLETE: CPT | Performed by: INTERNAL MEDICINE

## 2023-08-24 PROCEDURE — 99214 OFFICE O/P EST MOD 30 MIN: CPT | Performed by: INTERNAL MEDICINE

## 2023-08-24 PROCEDURE — 1123F ACP DISCUSS/DSCN MKR DOCD: CPT | Performed by: INTERNAL MEDICINE

## 2024-02-13 NOTE — PROGRESS NOTES
[facility]   Cardiac Evaluation      Patient: Barbie Shirley  YOB: 1950  Date: 2/22/24     CC: CAD      Referring provider: Heidy Stoner APRN - CNP    History of Present Illness:   Ms Shirley is seen today for follow up. She called the office on 3/16/23 stating her heart was pounding in her ears and she was SOB. She had a 14 day cardiac monitor placed that revealed atrial fibrillation, SVT longest event 21 beats. She also had an episode of double vision that lasted minutes.      She had a lab band and has been told that her esophagus is dilated which required some or all of the saline removed from the band.  She has requested to have it filled again due to increasing weight and has been told that this is not medically appropriate.     She presented to [facility] in 2016 with COPD exacerbation. trops consistently increased; 0.362, 0.967, 1.670. She underwent a cath through the radial artery. She has disease in D1 and RCA. She was placed on Metoprolol, Lipitor, Plavix, and asa.  She states I performed an angiogram years ago (prior to 2007)and that she had CAD with  collaterals. This was performed after she had an MI. She has extensive pulmonary disease w/ COPD but has quit smoking. . Surgeries include lap band surgery, tonsillectomy, carpal tunnel release, tubal ligation.     In the interim since last 10/2018, she had gastric band removed and pouch formed. At a previous visit, she told me she stopped taking asa, BB, & statin because she \"felt fine\"; I advised her to restart all of them for her overall CV health but she declined at that time.       She wore a cardiac monitor 3/20-3/30/23 that revealed atrial fibrillation burden 0.54%, longest run 47 minutes. Eliquis was initiated along with Toprol XL 50mg QD and Lipitor 20mg QD. She then had an echo 5/1/23 that revealed mild valvular ds and borderline LVH.      Today, Ms Shirley states she has bilateral leg numbness and pain with walking for about

## 2024-02-22 ENCOUNTER — OFFICE VISIT (OUTPATIENT)
Dept: CARDIOLOGY CLINIC | Age: 74
End: 2024-02-22
Payer: MEDICARE

## 2024-02-22 VITALS
BODY MASS INDEX: 24.91 KG/M2 | HEIGHT: 66 IN | OXYGEN SATURATION: 95 % | SYSTOLIC BLOOD PRESSURE: 122 MMHG | WEIGHT: 155 LBS | DIASTOLIC BLOOD PRESSURE: 80 MMHG | HEART RATE: 58 BPM

## 2024-02-22 DIAGNOSIS — I25.10 CORONARY ARTERY DISEASE INVOLVING NATIVE CORONARY ARTERY OF NATIVE HEART WITHOUT ANGINA PECTORIS: Primary | ICD-10-CM

## 2024-02-22 DIAGNOSIS — E78.49 OTHER HYPERLIPIDEMIA: ICD-10-CM

## 2024-02-22 DIAGNOSIS — R20.0 BILATERAL LEG NUMBNESS: ICD-10-CM

## 2024-02-22 DIAGNOSIS — I47.10 SVT (SUPRAVENTRICULAR TACHYCARDIA): ICD-10-CM

## 2024-02-22 DIAGNOSIS — I48.0 PAF (PAROXYSMAL ATRIAL FIBRILLATION) (HCC): ICD-10-CM

## 2024-02-22 DIAGNOSIS — I73.9 CLAUDICATION (HCC): ICD-10-CM

## 2024-02-22 PROCEDURE — 1123F ACP DISCUSS/DSCN MKR DOCD: CPT | Performed by: INTERNAL MEDICINE

## 2024-02-22 PROCEDURE — 99214 OFFICE O/P EST MOD 30 MIN: CPT | Performed by: INTERNAL MEDICINE

## 2024-02-22 RX ORDER — VARENICLINE TARTRATE 0.5 (11)-1
KIT ORAL
COMMUNITY
Start: 2023-12-26

## 2024-03-12 ENCOUNTER — HOSPITAL ENCOUNTER (OUTPATIENT)
Dept: VASCULAR LAB | Age: 74
Discharge: HOME | End: 2024-03-12
Payer: MEDICARE

## 2024-03-12 DIAGNOSIS — R20.0 BILATERAL LEG NUMBNESS: ICD-10-CM

## 2024-03-12 DIAGNOSIS — I73.9 CLAUDICATION (HCC): ICD-10-CM

## 2024-03-12 PROCEDURE — 93923 UPR/LXTR ART STDY 3+ LVLS: CPT

## 2024-08-27 RX ORDER — APIXABAN 5 MG/1
5 TABLET, FILM COATED ORAL 2 TIMES DAILY
Qty: 60 TABLET | Refills: 3 | OUTPATIENT
Start: 2024-08-27

## 2024-09-10 NOTE — PROGRESS NOTES
Start date: 1973     Quit date: 2013     Years since quittin.2    Smokeless tobacco: Never   Substance and Sexual Activity    Alcohol use: No    Drug use: Not on file    Sexual activity: Not on file   Other Topics Concern    Not on file   Social History Narrative    Not on file     Social Determinants of Health     Financial Resource Strain: Not on file   Food Insecurity: No Transportation Needs (2023)    Received from  [employer],  [employer],  [employer],  [employer],  [employer],  [employer]    Yearly Questionnaire     Do you need any assistance with obtaining housing, meals, medication, transportation or medical equipment?: No     Assistance needed for:: Not on file   Transportation Needs: No Transportation Needs (2023)    Received from  [employer],  [employer],  [employer],  [employer],  [employer],  [employer]    Yearly Questionnaire     Do you need any assistance with obtaining housing, meals, medication, transportation or medical equipment?: No     Assistance needed for:: Not on file   Physical Activity: Not on file   Stress: Not on file   Social Connections: Not on file   Intimate Partner Violence: Not on file   Housing Stability: No Transportation Needs (2023)    Received from  [employer],  [employer],  [employer],  [employer],  [employer],  [employer]    Yearly Questionnaire     Do you need any assistance with obtaining housing, meals, medication, transportation or medical equipment?: No     Assistance needed for:: Not on file     Family History:  family history includes Diabetes in her father and mother; Heart Disease in her father.     Allergies:  Percocet [oxycodone-acetaminophen]     Review of Systems:   Constitutional: there has been no unanticipated weight loss. No change in energy or activity level   Eyes: No visual changes   ENT: No Headaches, hearing loss or vertigo. No mouth sores or sore throat.  Cardiovascular: Reviewed in HPI  Respiratory: No cough or wheezing, no sputum production.

## 2024-09-30 ENCOUNTER — OFFICE VISIT (OUTPATIENT)
Dept: CARDIOLOGY CLINIC | Age: 74
End: 2024-09-30
Payer: MEDICARE

## 2024-09-30 VITALS
SYSTOLIC BLOOD PRESSURE: 122 MMHG | DIASTOLIC BLOOD PRESSURE: 70 MMHG | HEIGHT: 66 IN | WEIGHT: 156 LBS | BODY MASS INDEX: 25.07 KG/M2 | HEART RATE: 77 BPM | OXYGEN SATURATION: 96 %

## 2024-09-30 DIAGNOSIS — I25.10 CORONARY ARTERY DISEASE INVOLVING NATIVE CORONARY ARTERY OF NATIVE HEART WITHOUT ANGINA PECTORIS: Primary | ICD-10-CM

## 2024-09-30 DIAGNOSIS — I47.10 SVT (SUPRAVENTRICULAR TACHYCARDIA) (HCC): ICD-10-CM

## 2024-09-30 DIAGNOSIS — E78.49 OTHER HYPERLIPIDEMIA: ICD-10-CM

## 2024-09-30 DIAGNOSIS — I48.0 PAF (PAROXYSMAL ATRIAL FIBRILLATION) (HCC): ICD-10-CM

## 2024-09-30 PROCEDURE — 1123F ACP DISCUSS/DSCN MKR DOCD: CPT | Performed by: INTERNAL MEDICINE

## 2024-09-30 PROCEDURE — 99214 OFFICE O/P EST MOD 30 MIN: CPT | Performed by: INTERNAL MEDICINE

## 2024-09-30 RX ORDER — ATORVASTATIN CALCIUM 20 MG/1
20 TABLET, FILM COATED ORAL DAILY
Qty: 90 TABLET | Refills: 3 | Status: CANCELLED | OUTPATIENT
Start: 2024-09-30

## 2024-09-30 RX ORDER — ATORVASTATIN CALCIUM 20 MG/1
20 TABLET, FILM COATED ORAL DAILY
Qty: 90 TABLET | Refills: 3 | Status: SHIPPED | OUTPATIENT
Start: 2024-09-30

## 2024-10-08 RX ORDER — METOPROLOL SUCCINATE 50 MG/1
50 TABLET, EXTENDED RELEASE ORAL DAILY
Qty: 90 TABLET | Refills: 3 | Status: SHIPPED | OUTPATIENT
Start: 2024-10-08

## 2025-03-05 NOTE — PROGRESS NOTES
[facility]   Cardiac Evaluation      Patient: Barbie Shirley  YOB: 1950  Date: 3/12/25     CC: CAD      Referring provider: Heidy Stoner APRN - CNP    History of Present Illness:   Ms Shirley is seen today for follow up. She called the office on 3/16/23 stating her heart was pounding in her ears and she was SOB. She had a 14 day cardiac monitor placed that revealed atrial fibrillation, SVT longest event 21 beats. She also had an episode of double vision that lasted minutes.      She presented to [facility] in 2016 with COPD exacerbation. trops consistently increased; 0.362, 0.967, 1.670. She underwent a cath through the radial artery. She has disease in D1 and RCA. She was placed on Metoprolol, Lipitor, Plavix, and asa.  She states I performed an angiogram years ago (prior to 2007)and that she had CAD with  collaterals. This was performed after she had an MI. She has extensive pulmonary disease w/ COPD but has quit smoking. . Surgeries include lap band surgery, tonsillectomy, carpal tunnel release, tubal ligation.      She had gastric band removed and pouch formed. At a previous visit, she told me she stopped taking asa, BB, & statin because she \"felt fine\"; I advised her to restart all of them for her overall CV health but she declined at that time.       She wore a cardiac monitor 3/20-3/30/23 that revealed atrial fibrillation burden 0.54%, longest run 47 minutes. Eliquis was initiated along with Toprol XL 50mg QD and Lipitor 20mg QD. She then had an echo 5/1/23 that revealed mild valvular ds and borderline LVH.      Today, Ms Shirley states she has been feeling fine. She denies chest pain, palpitations, SHULTZ, dizziness, or edema. She walks her dog for exercise.     Past Medical History:   has a past medical history of MI (myocardial infarction) (HCC). COPD, hx morbid obesity, PAF, hyperlipidemia    Surgical History:   has a past surgical history that includes Tonsillectomy; Lap Band; Carpal  no

## 2025-03-12 ENCOUNTER — OFFICE VISIT (OUTPATIENT)
Dept: CARDIOLOGY CLINIC | Age: 75
End: 2025-03-12
Payer: MEDICARE

## 2025-03-12 VITALS
DIASTOLIC BLOOD PRESSURE: 66 MMHG | HEART RATE: 64 BPM | WEIGHT: 152 LBS | BODY MASS INDEX: 24.43 KG/M2 | HEIGHT: 66 IN | SYSTOLIC BLOOD PRESSURE: 112 MMHG | OXYGEN SATURATION: 94 %

## 2025-03-12 DIAGNOSIS — I25.10 CORONARY ARTERY DISEASE INVOLVING NATIVE CORONARY ARTERY OF NATIVE HEART WITHOUT ANGINA PECTORIS: Primary | ICD-10-CM

## 2025-03-12 DIAGNOSIS — I48.0 PAF (PAROXYSMAL ATRIAL FIBRILLATION) (HCC): ICD-10-CM

## 2025-03-12 DIAGNOSIS — E78.49 OTHER HYPERLIPIDEMIA: ICD-10-CM

## 2025-03-12 PROCEDURE — 1159F MED LIST DOCD IN RCRD: CPT | Performed by: INTERNAL MEDICINE

## 2025-03-12 PROCEDURE — 99214 OFFICE O/P EST MOD 30 MIN: CPT | Performed by: INTERNAL MEDICINE

## 2025-03-12 PROCEDURE — 1123F ACP DISCUSS/DSCN MKR DOCD: CPT | Performed by: INTERNAL MEDICINE

## 2025-03-12 PROCEDURE — G2211 COMPLEX E/M VISIT ADD ON: HCPCS | Performed by: INTERNAL MEDICINE

## 2025-03-20 RX ORDER — METOPROLOL SUCCINATE 50 MG/1
50 TABLET, EXTENDED RELEASE ORAL DAILY
Qty: 90 TABLET | Refills: 3 | Status: SHIPPED | OUTPATIENT
Start: 2025-03-20

## 2025-03-20 NOTE — TELEPHONE ENCOUNTER
Refill requested from [facility] via fax    3/12/2025 OV with [facility]    9/17/2025 next OV

## 2025-08-29 ENCOUNTER — TELEPHONE (OUTPATIENT)
Dept: CARDIOLOGY CLINIC | Age: 75
End: 2025-08-29